# Patient Record
Sex: MALE | Race: WHITE | NOT HISPANIC OR LATINO | URBAN - METROPOLITAN AREA
[De-identification: names, ages, dates, MRNs, and addresses within clinical notes are randomized per-mention and may not be internally consistent; named-entity substitution may affect disease eponyms.]

---

## 2021-12-02 ENCOUNTER — OFFICE VISIT (OUTPATIENT)
Dept: URGENT CARE | Facility: CLINIC | Age: 21
End: 2021-12-02
Payer: COMMERCIAL

## 2021-12-02 VITALS
RESPIRATION RATE: 16 BRPM | DIASTOLIC BLOOD PRESSURE: 88 MMHG | SYSTOLIC BLOOD PRESSURE: 122 MMHG | TEMPERATURE: 99 F | WEIGHT: 217 LBS | HEART RATE: 75 BPM | BODY MASS INDEX: 32.14 KG/M2 | OXYGEN SATURATION: 97 % | HEIGHT: 69 IN

## 2021-12-02 DIAGNOSIS — B96.89 BACTERIAL SINUSITIS: Primary | ICD-10-CM

## 2021-12-02 DIAGNOSIS — R52 GENERALIZED BODY ACHES: ICD-10-CM

## 2021-12-02 DIAGNOSIS — J32.9 BACTERIAL SINUSITIS: Primary | ICD-10-CM

## 2021-12-02 DIAGNOSIS — J34.89 SINUS PRESSURE: ICD-10-CM

## 2021-12-02 DIAGNOSIS — R09.81 NASAL CONGESTION: ICD-10-CM

## 2021-12-02 LAB
CTP QC/QA: YES
SARS-COV-2 RDRP RESP QL NAA+PROBE: NEGATIVE

## 2021-12-02 PROCEDURE — 99203 OFFICE O/P NEW LOW 30 MIN: CPT | Mod: S$GLB,,, | Performed by: NURSE PRACTITIONER

## 2021-12-02 PROCEDURE — U0002 COVID-19 LAB TEST NON-CDC: HCPCS | Mod: QW,S$GLB,, | Performed by: NURSE PRACTITIONER

## 2021-12-02 PROCEDURE — U0002: ICD-10-PCS | Mod: QW,S$GLB,, | Performed by: NURSE PRACTITIONER

## 2021-12-02 PROCEDURE — 99203 PR OFFICE/OUTPT VISIT, NEW, LEVL III, 30-44 MIN: ICD-10-PCS | Mod: S$GLB,,, | Performed by: NURSE PRACTITIONER

## 2021-12-02 RX ORDER — AZITHROMYCIN 250 MG/1
TABLET, FILM COATED ORAL
Qty: 6 TABLET | Refills: 0 | Status: SHIPPED | OUTPATIENT
Start: 2021-12-02

## 2021-12-02 RX ORDER — ALBUTEROL SULFATE 0.63 MG/3ML
0.63 SOLUTION RESPIRATORY (INHALATION) EVERY 6 HOURS PRN
COMMUNITY

## 2021-12-02 RX ORDER — FLUTICASONE PROPIONATE 50 MCG
2 SPRAY, SUSPENSION (ML) NASAL DAILY
Qty: 9.9 ML | Refills: 0 | Status: SHIPPED | OUTPATIENT
Start: 2021-12-02

## 2021-12-02 RX ORDER — CETIRIZINE HYDROCHLORIDE 10 MG/1
10 TABLET ORAL NIGHTLY
Qty: 30 TABLET | Refills: 0 | Status: SHIPPED | OUTPATIENT
Start: 2021-12-02

## 2021-12-05 ENCOUNTER — TELEPHONE (OUTPATIENT)
Dept: URGENT CARE | Facility: CLINIC | Age: 21
End: 2021-12-05
Payer: COMMERCIAL

## 2022-02-11 ENCOUNTER — HOSPITAL ENCOUNTER (OUTPATIENT)
Dept: RADIOLOGY | Facility: CLINIC | Age: 22
Discharge: HOME | End: 2022-02-11
Attending: INTERNAL MEDICINE
Payer: COMMERCIAL

## 2022-02-11 ENCOUNTER — OFFICE VISIT (OUTPATIENT)
Dept: URGENT CARE | Facility: CLINIC | Age: 22
End: 2022-02-11
Payer: COMMERCIAL

## 2022-02-11 ENCOUNTER — HOSPITAL ENCOUNTER (OUTPATIENT)
Dept: RADIOLOGY | Facility: CLINIC | Age: 22
Discharge: HOME | End: 2022-02-11
Attending: INTERNAL MEDICINE

## 2022-02-11 VITALS
OXYGEN SATURATION: 98 % | TEMPERATURE: 98 F | BODY MASS INDEX: 31.84 KG/M2 | HEIGHT: 69 IN | DIASTOLIC BLOOD PRESSURE: 81 MMHG | SYSTOLIC BLOOD PRESSURE: 139 MMHG | HEART RATE: 68 BPM | RESPIRATION RATE: 18 BRPM | WEIGHT: 215 LBS

## 2022-02-11 DIAGNOSIS — R07.81 RIB PAIN: Primary | ICD-10-CM

## 2022-02-11 DIAGNOSIS — S20.211A CONTUSION OF RIB ON RIGHT SIDE, INITIAL ENCOUNTER: ICD-10-CM

## 2022-02-11 DIAGNOSIS — R07.81 PLEURODYNIA: ICD-10-CM

## 2022-02-11 PROCEDURE — 71046 X-RAY EXAM CHEST 2 VIEWS: CPT | Mod: 59,S$GLB,, | Performed by: RADIOLOGY

## 2022-02-11 PROCEDURE — 99215 PR OFFICE/OUTPT VISIT, EST, LEVL V, 40-54 MIN: ICD-10-PCS | Mod: 25,S$GLB,, | Performed by: INTERNAL MEDICINE

## 2022-02-11 PROCEDURE — 99215 OFFICE O/P EST HI 40 MIN: CPT | Mod: 25,S$GLB,, | Performed by: INTERNAL MEDICINE

## 2022-02-11 PROCEDURE — 96372 PR INJECTION,THERAP/PROPH/DIAG2ST, IM OR SUBCUT: ICD-10-PCS | Mod: S$GLB,,, | Performed by: INTERNAL MEDICINE

## 2022-02-11 PROCEDURE — 3008F PR BODY MASS INDEX (BMI) DOCUMENTED: ICD-10-PCS | Mod: CPTII,S$GLB,, | Performed by: INTERNAL MEDICINE

## 2022-02-11 PROCEDURE — 71101 XR RIBS MIN 3 VIEWS W/ PA CHEST RIGHT: ICD-10-PCS | Mod: RT,S$GLB,, | Performed by: RADIOLOGY

## 2022-02-11 PROCEDURE — 71046 XR CHEST PA AND LATERAL: ICD-10-PCS | Mod: 59,S$GLB,, | Performed by: RADIOLOGY

## 2022-02-11 PROCEDURE — 1159F PR MEDICATION LIST DOCUMENTED IN MEDICAL RECORD: ICD-10-PCS | Mod: CPTII,S$GLB,, | Performed by: INTERNAL MEDICINE

## 2022-02-11 PROCEDURE — 3079F PR MOST RECENT DIASTOLIC BLOOD PRESSURE 80-89 MM HG: ICD-10-PCS | Mod: CPTII,S$GLB,, | Performed by: INTERNAL MEDICINE

## 2022-02-11 PROCEDURE — 3075F PR MOST RECENT SYSTOLIC BLOOD PRESS GE 130-139MM HG: ICD-10-PCS | Mod: CPTII,S$GLB,, | Performed by: INTERNAL MEDICINE

## 2022-02-11 PROCEDURE — 96372 THER/PROPH/DIAG INJ SC/IM: CPT | Mod: S$GLB,,, | Performed by: INTERNAL MEDICINE

## 2022-02-11 PROCEDURE — 3079F DIAST BP 80-89 MM HG: CPT | Mod: CPTII,S$GLB,, | Performed by: INTERNAL MEDICINE

## 2022-02-11 PROCEDURE — 1159F MED LIST DOCD IN RCRD: CPT | Mod: CPTII,S$GLB,, | Performed by: INTERNAL MEDICINE

## 2022-02-11 PROCEDURE — 71101 X-RAY EXAM UNILAT RIBS/CHEST: CPT | Mod: RT,S$GLB,, | Performed by: RADIOLOGY

## 2022-02-11 PROCEDURE — 3075F SYST BP GE 130 - 139MM HG: CPT | Mod: CPTII,S$GLB,, | Performed by: INTERNAL MEDICINE

## 2022-02-11 PROCEDURE — 3008F BODY MASS INDEX DOCD: CPT | Mod: CPTII,S$GLB,, | Performed by: INTERNAL MEDICINE

## 2022-02-11 RX ORDER — NAPROXEN SODIUM 220 MG
220 TABLET ORAL 2 TIMES DAILY WITH MEALS
Qty: 14 TABLET | Refills: 0 | Status: SHIPPED | OUTPATIENT
Start: 2022-02-12 | End: 2022-02-19

## 2022-02-11 RX ORDER — KETOROLAC TROMETHAMINE 30 MG/ML
30 INJECTION, SOLUTION INTRAMUSCULAR; INTRAVENOUS
Status: COMPLETED | OUTPATIENT
Start: 2022-02-11 | End: 2022-02-11

## 2022-02-11 RX ADMIN — KETOROLAC TROMETHAMINE 30 MG: 30 INJECTION, SOLUTION INTRAMUSCULAR; INTRAVENOUS at 11:02

## 2022-02-11 NOTE — LETTER
February 11, 2022      [facility]  [address], [address]  [place] [address]  Phone: [phone]       Patient: Arvin Hsu   YOB: 2000  Date of Visit: 02/11/2022    To Whom It May Concern:    Clover Hsu  was at [facility] on 02/11/2022. The patient may return to work/school on 02/14/2022 with no restrictions. If you have any questions or concerns, or if I can be of further assistance, please do not hesitate to contact me.    Sincerely,    Tamika Hannah, RT

## 2022-02-11 NOTE — PATIENT INSTRUCTIONS
Do 5-6 deep breaths every time there is a commercial on the TV. Use a heating pad during the day. Use alave twice daily starting tomorrow.     If you develop fever, nausea with vomiting, or worsening symptoms in any way, I recommend re-evaluation in the ER.       Patient Education       Bruised Rib Discharge Instructions   About this topic   A bruised rib is also known as a rib contusion. Your ribs are a group of bones that wrap around your chest. They protect the organs inside your chest like your heart and lungs. Most broken or bruised ribs happen when you fall onto your chest or are hit on the chest. A severe cough or doing the same motion over and over can also cause bruised or broken ribs. It can take 6 to 8 weeks for broken or bruised ribs to heal and be free of pain.  What care is needed at home?   · Ask your doctor what you need to do when you go home. Make sure you ask questions if you do not understand what the doctor says.  · Take 10 to 15 slow deep breaths at least 4 times each day. This will lower your chances of getting a lung infection. Use your incentive spirometer as you were told if you were given one. An incentive spirometer is a tool that measures how deeply you can breathe in.  · Hold a pillow to your chest when you take deep breaths, sneeze, cough, or laugh. This will help ease the pain in your ribs.  · It may hurt less to sleep in a reclined position. You can also sleep with your head and shoulders propped up on pillows.  · You may want to take medicines like ibuprofen or naproxen for swelling and pain. These are nonsteroidal anti-inflammatory drugs (NSAIDS).  · Ice may help you ease pain and swelling.  · Place an ice pack or a bag of frozen vegetables wrapped in a towel over the painful part. Never put ice right on the skin. Do not leave the ice on more than 10 to 15 minutes at a time. Use for the first 24 to 48 hours after an injury.  · If you smoke, try to quit. Broken bones take longer to  heal if you smoke.  What follow-up care is needed?   Your doctor may ask you to make visits to the office to check on your progress. Be sure to keep these visits.  What drugs may be needed?   The doctor may order drugs to:  · Help with pain and swelling  Will physical activity be limited?   It may take up to 6 to 8 weeks for your rib to heal. You should not do physical activity that makes your rib hurt more. If you run, work out, or play sports, you may not be able to do those things until your health problem gets better.  What problems could happen?   Chest infection, like pneumonia, due to not being able to take deep breaths.  What can be done to prevent this health problem?   · Wear protective gear when playing contact sports.  · Decrease your chance of falling. Move anything that may cause you to trip, clean up spills right away, and always have good lighting. Use railings on stairs.  · Always wear a seat belt. Drive safely. Obey speed limits. Do not drink and drive.  When do I need to call the doctor?   · It is getting harder and harder to breathe.  · You are so short of breath you cannot talk in a full sentence.  · You develop severe pain in your chest, back, or neck.  · You start to cough up blood or yellow or green mucus.  · You have a fever of 100.4°F (38°C) or higher or chills.  · You are very weak or light-headed or feel like you might pass out.  · You have very bad pain that is not helped by your medicines.  Teach Back: Helping You Understand   The Teach Back Method helps you understand the information we are giving you. After you talk with the staff, tell them in your own words what you learned. This helps to make sure the staff has described each thing clearly. It also helps to explain things that may have been confusing. Before going home, make sure you can do these:  · I can tell you about my pain.  · I can tell you what may help ease my pain.  · I can tell you when I can go back to my normal  activities.  Where can I learn more?   NHS Choices  http://www.nhs.uk/conditions/rib-injuries/pages/introduction.aspx   Last Reviewed Date   2021-06-10  Consumer Information Use and Disclaimer   This information is not specific medical advice and does not replace information you receive from your health care provider. This is only a brief summary of general information. It does NOT include all information about conditions, illnesses, injuries, tests, procedures, treatments, therapies, discharge instructions or life-style choices that may apply to you. You must talk with your health care provider for complete information about your health and treatment options. This information should not be used to decide whether or not to accept your health care providers advice, instructions or recommendations. Only your health care provider has the knowledge and training to provide advice that is right for you.  Copyright   Copyright © 2021 [employer] and its affiliates and/or licensors. All rights reserved.

## 2022-02-11 NOTE — PROGRESS NOTES
"Subjective:       Patient ID: Arvin Hsu is a 21 y.o. male.    Vitals:  height is 5' 9" (1.753 m) and weight is 97.5 kg (215 lb). His tympanic temperature is 98.4 °F (36.9 °C). His blood pressure is 139/81 and his pulse is 68. His respiration is 18 and oxygen saturation is 98%.     Chief Complaint: Flank Pain    Pt. Stated a chair fell under him and landed on top of the metal frame. Pt. Stated he did take some dual action advil was able to sleep. Also experiencing some weird noises coming from the  neck area.    Flank Pain  This is a new problem. The current episode started 1 to 4 weeks ago. The problem occurs constantly. The problem has been gradually worsening since onset. Pain location: right side. Radiates to: right back side. The pain is at a severity of 8/10. The pain is severe. The pain is worse during the night. The symptoms are aggravated by sitting (deep breathes/moving). Stiffness is present all day. Associated symptoms include chest pain, headaches, numbness, tingling and weakness. Pertinent negatives include no abdominal pain, bladder incontinence, bowel incontinence, dysuria, fever, leg pain, paresis, paresthesias, pelvic pain, perianal numbness or weight loss. Treatments tried: advil dual action. The treatment provided significant (was able to sleep) relief.       Constitution: Negative for fever.   Cardiovascular: Positive for chest pain.   Gastrointestinal: Negative for abdominal pain and bowel incontinence.   Genitourinary: Positive for flank pain. Negative for dysuria, bladder incontinence and pelvic pain.   Skin: Negative for erythema.   Neurological: Positive for headaches and numbness.       Objective:      Physical Exam   Constitutional: He is oriented to person, place, and time. He appears well-developed and well-nourished. No distress.   HENT:   Head: Normocephalic and atraumatic.   Ears:   Right Ear: External ear normal.   Left Ear: External ear normal.   Nose: Nose normal. "   Mouth/Throat: Oropharynx is clear and moist. No oropharyngeal exudate.   Eyes: Conjunctivae and EOM are normal. Pupils are equal, round, and reactive to light. Right eye exhibits no discharge. Left eye exhibits no discharge. No scleral icterus.   Neck: Neck supple.   Cardiovascular: Normal rate, regular rhythm, normal heart sounds and intact distal pulses.   No murmur heard.Exam reveals no gallop and no friction rub.   Pulmonary/Chest: Effort normal. No respiratory distress. He has no wheezes. He has no rales. He exhibits tenderness.    Comments: Anterior chest wall tenderness overlying 7th rib, wrapping around torso. No flail chest. No deformity noted. CTAB. Neck palpated. No crepitus felt.     Abdominal: Bowel sounds are normal. He exhibits no distension. Soft. There is no abdominal tenderness. There is no guarding.   Musculoskeletal:         General: No edema.   Lymphadenopathy:     He has no cervical adenopathy.   Neurological: He is alert and oriented to person, place, and time.   Skin: Skin is warm, dry, not diaphoretic and no rash. Capillary refill takes less than 2 seconds. No erythema   Psychiatric: He has a normal mood and affect. His behavior is normal.   Nursing note and vitals reviewed.      Results for orders placed or performed in visit on 12/02/21   POCT COVID-19 Rapid Screening   Result Value Ref Range    POC Rapid COVID Negative Negative     Acceptable Yes        XR CHEST PA AND LATERAL    Result Date: 2/11/2022  EXAMINATION: XR CHEST PA AND LATERAL CLINICAL HISTORY: clinical question of possible PTX or pneumomedistinum in patient with rib trauma and pleuritic chest pain; Pleurodynia TECHNIQUE: PA and lateral views of the chest were performed. COMPARISON: 02/28/2012 FINDINGS: The lungs are clear and free of infiltrate.  No pleural effusion or pneumothorax. The heart is not enlarged.     1.  No acute cardiopulmonary process. Electronically signed by: Javier Santos DO  Date:    02/11/2022 Time:    11:14    XR RIB RIGHT W/ PA CHEST    Result Date: 2/11/2022  EXAMINATION: XR RIBS MIN 3 VIEWS W/ PA CHEST RIGHT CLINICAL HISTORY: fracture suspect 8th rib on right, pain more in anterior portion of rib.;  Pleurodynia TECHNIQUE: PA view of the chest with right rib series COMPARISON: None FINDINGS: There are no acute right-sided rib fractures.  No pleural effusion or pneumothorax identified.     As above Electronically signed by: Javier Santos DO Date:    02/11/2022 Time:    11:12        Assessment:       1. Rib pain    2. Contusion of rib on right side, initial encounter    3. Pleurodynia          Plan:         Rib pain  -     XR RIB RIGHT W/ PA CHEST; Future; Expected date: 02/11/2022  -     XR CHEST PA AND LATERAL; Future; Expected date: 02/11/2022  -     ketorolac injection 30 mg  -     naproxen sodium (ALEVE) 220 MG tablet; Take 1 tablet (220 mg total) by mouth 2 (two) times daily with meals. for 7 days  Dispense: 14 tablet; Refill: 0    Contusion of rib on right side, initial encounter  -     naproxen sodium (ALEVE) 220 MG tablet; Take 1 tablet (220 mg total) by mouth 2 (two) times daily with meals. for 7 days  Dispense: 14 tablet; Refill: 0    Pleurodynia  -     naproxen sodium (ALEVE) 220 MG tablet; Take 1 tablet (220 mg total) by mouth 2 (two) times daily with meals. for 7 days  Dispense: 14 tablet; Refill: 0

## 2022-02-14 ENCOUNTER — TELEPHONE (OUTPATIENT)
Dept: URGENT CARE | Facility: CLINIC | Age: 22
End: 2022-02-14
Payer: COMMERCIAL